# Patient Record
Sex: FEMALE | Race: WHITE | NOT HISPANIC OR LATINO | URBAN - METROPOLITAN AREA
[De-identification: names, ages, dates, MRNs, and addresses within clinical notes are randomized per-mention and may not be internally consistent; named-entity substitution may affect disease eponyms.]

---

## 2017-07-10 ENCOUNTER — OFFICE VISIT - HEALTHEAST (OUTPATIENT)
Dept: PODIATRY | Facility: CLINIC | Age: 62
End: 2017-07-10

## 2017-07-10 ENCOUNTER — RECORDS - HEALTHEAST (OUTPATIENT)
Dept: ADMINISTRATIVE | Facility: OTHER | Age: 62
End: 2017-07-10

## 2017-07-10 ENCOUNTER — RECORDS - HEALTHEAST (OUTPATIENT)
Dept: GENERAL RADIOLOGY | Facility: CLINIC | Age: 62
End: 2017-07-10

## 2017-07-10 DIAGNOSIS — M20.10 HALLUX VALGUS, UNSPECIFIED LATERALITY: ICD-10-CM

## 2017-07-10 DIAGNOSIS — M20.41 OTHER HAMMER TOE(S) (ACQUIRED), RIGHT FOOT: ICD-10-CM

## 2017-07-10 DIAGNOSIS — M20.10 HALLUX VALGUS (ACQUIRED), UNSPECIFIED FOOT: ICD-10-CM

## 2017-07-10 DIAGNOSIS — M20.41 HAMMER TOES OF BOTH FEET: ICD-10-CM

## 2017-07-10 DIAGNOSIS — M20.42 HAMMER TOES OF BOTH FEET: ICD-10-CM

## 2017-07-10 DIAGNOSIS — M20.42 OTHER HAMMER TOE(S) (ACQUIRED), LEFT FOOT: ICD-10-CM

## 2017-07-11 ENCOUNTER — COMMUNICATION - HEALTHEAST (OUTPATIENT)
Dept: ADMINISTRATIVE | Facility: CLINIC | Age: 62
End: 2017-07-11

## 2017-07-20 ENCOUNTER — RECORDS - HEALTHEAST (OUTPATIENT)
Dept: ADMINISTRATIVE | Facility: OTHER | Age: 62
End: 2017-07-20

## 2017-07-20 ENCOUNTER — COMMUNICATION - HEALTHEAST (OUTPATIENT)
Dept: ADMINISTRATIVE | Facility: CLINIC | Age: 62
End: 2017-07-20

## 2017-07-20 ENCOUNTER — COMMUNICATION - HEALTHEAST (OUTPATIENT)
Dept: INTERNAL MEDICINE | Facility: CLINIC | Age: 62
End: 2017-07-20

## 2017-07-21 ENCOUNTER — RECORDS - HEALTHEAST (OUTPATIENT)
Dept: ADMINISTRATIVE | Facility: OTHER | Age: 62
End: 2017-07-21

## 2017-10-03 ENCOUNTER — RECORDS - HEALTHEAST (OUTPATIENT)
Dept: ADMINISTRATIVE | Facility: OTHER | Age: 62
End: 2017-10-03

## 2018-03-23 ENCOUNTER — RECORDS - HEALTHEAST (OUTPATIENT)
Dept: LAB | Facility: CLINIC | Age: 63
End: 2018-03-23

## 2018-03-23 LAB
ALBUMIN SERPL-MCNC: 4 G/DL (ref 3.5–5)
ALP SERPL-CCNC: 78 U/L (ref 45–120)
ALT SERPL W P-5'-P-CCNC: 42 U/L (ref 0–45)
ANION GAP SERPL CALCULATED.3IONS-SCNC: 10 MMOL/L (ref 5–18)
AST SERPL W P-5'-P-CCNC: 39 U/L (ref 0–40)
BASOPHILS # BLD AUTO: 0.1 THOU/UL (ref 0–0.2)
BASOPHILS NFR BLD AUTO: 1 % (ref 0–2)
BILIRUB SERPL-MCNC: 0.7 MG/DL (ref 0–1)
BUN SERPL-MCNC: 12 MG/DL (ref 8–22)
CALCIUM SERPL-MCNC: 9.2 MG/DL (ref 8.5–10.5)
CHLORIDE BLD-SCNC: 104 MMOL/L (ref 98–107)
CHOLEST SERPL-MCNC: 266 MG/DL
CO2 SERPL-SCNC: 25 MMOL/L (ref 22–31)
CREAT SERPL-MCNC: 0.75 MG/DL (ref 0.6–1.1)
EOSINOPHIL # BLD AUTO: 0.2 THOU/UL (ref 0–0.4)
EOSINOPHIL NFR BLD AUTO: 4 % (ref 0–6)
ERYTHROCYTE [DISTWIDTH] IN BLOOD BY AUTOMATED COUNT: 12.7 % (ref 11–14.5)
FASTING STATUS PATIENT QL REPORTED: YES
GFR SERPL CREATININE-BSD FRML MDRD: >60 ML/MIN/1.73M2
GLUCOSE BLD-MCNC: 97 MG/DL (ref 70–125)
HCT VFR BLD AUTO: 43.3 % (ref 35–47)
HDLC SERPL-MCNC: 81 MG/DL
HGB BLD-MCNC: 14 G/DL (ref 12–16)
LDLC SERPL CALC-MCNC: 167 MG/DL
LYMPHOCYTES # BLD AUTO: 2 THOU/UL (ref 0.8–4.4)
LYMPHOCYTES NFR BLD AUTO: 35 % (ref 20–40)
MCH RBC QN AUTO: 33.2 PG (ref 27–34)
MCHC RBC AUTO-ENTMCNC: 32.3 G/DL (ref 32–36)
MCV RBC AUTO: 103 FL (ref 80–100)
MONOCYTES # BLD AUTO: 0.6 THOU/UL (ref 0–0.9)
MONOCYTES NFR BLD AUTO: 10 % (ref 2–10)
NEUTROPHILS # BLD AUTO: 2.9 THOU/UL (ref 2–7.7)
NEUTROPHILS NFR BLD AUTO: 50 % (ref 50–70)
PLATELET # BLD AUTO: 244 THOU/UL (ref 140–440)
PMV BLD AUTO: 10.7 FL (ref 8.5–12.5)
POTASSIUM BLD-SCNC: 5.1 MMOL/L (ref 3.5–5)
PROT SERPL-MCNC: 7.3 G/DL (ref 6–8)
RBC # BLD AUTO: 4.22 MILL/UL (ref 3.8–5.4)
SODIUM SERPL-SCNC: 139 MMOL/L (ref 136–145)
TRIGL SERPL-MCNC: 91 MG/DL
TSH SERPL DL<=0.005 MIU/L-ACNC: 2.34 UIU/ML (ref 0.3–5)
WBC: 5.8 THOU/UL (ref 4–11)

## 2021-05-28 ENCOUNTER — RECORDS - HEALTHEAST (OUTPATIENT)
Dept: ADMINISTRATIVE | Facility: CLINIC | Age: 66
End: 2021-05-28

## 2021-06-11 NOTE — PROGRESS NOTES
Admission History & Physical  Rossi Briceno, 1955, [id number]    [facility]  Slade He MD, [phone]    Extended Emergency Contact Information  Primary Emergency Contact: Harlan Qureshi  Address: [address] [place] [address]            [place] [address] [facility]  Home Phone: [phone]  Mobile Phone: [phone]  Relation: Spouse     Assessment and Plan:   Assessment: Hallux abductovalgus right foot, hammertoe second toe both feet, pronation deformity  Plan: I have recommended hammertoe correction second toe both feet and I have also recommended new orthotics.  Active Problems:    * No active hospital problems. *      Chief Complaint:  painful hammertoes both feet and painful big toe joint right foot      HPI:    Rossi Briceno is a 61 y.o. old female who presented to the clinic today complaining of pain involving both feet.  She indicated that she has seen a podiatrist approximately 8 years ago.  She was having problems with hammertoes at that time.  She was given a pair of orthotics.  At the time the orthotics were helping.  Now her hammertoes have gotten worse.  She finds it difficult to wear shoes without pain.  She has tried shoe modification.  She has been wearing new balance which seems to give her some additional room for her toes.  She also complained of a painful big toe joint right foot.  She has a bunion of the right foot.  She likes to remain active and exercise.  This is now beginning to cause pain.  History is provided by patient    Medical History  Active Ambulatory (Non-Hospital) Problems    Diagnosis     Major depression, recurrent, chronic     Mixed anxiety and depressive disorder     Hypothyroidism     GERD (gastroesophageal reflux disease)     Epigastric pain     Osteopenia     Hematoma     Past Medical History:   Diagnosis Date     Anxiety      Depression with anxiety      Disease of thyroid gland      DJD (degenerative joint disease) of cervical spine       GERD (gastroesophageal reflux disease)      Vertigo      Patient Active Problem List    Diagnosis Date Noted     Major depression, recurrent, chronic 04/06/2015     Mixed anxiety and depressive disorder 04/06/2015     Hypothyroidism 04/06/2015     GERD (gastroesophageal reflux disease) 04/06/2015     Epigastric pain 04/06/2015     Osteopenia 04/06/2015     Hematoma 11/25/2014     Surgical History  She  has a past surgical history that includes Appendectomy and Tonsillectomy.   Past Surgical History:   Procedure Laterality Date     APPENDECTOMY       TONSILLECTOMY      Social History  Reviewed, and she  reports that she has never smoked. She has never used smokeless tobacco. She reports that she drinks alcohol. She reports that she does not use illicit drugs.  Social History   Substance Use Topics     Smoking status: Never Smoker     Smokeless tobacco: Never Used     Alcohol use Yes      Comment: 5 glasses of wine / week      Allergies  Allergies   Allergen Reactions     Codeine      Hypotension with oxycodone     Oxycodone Other (See Comments)     Blood pressure dropped     Family History  Reviewed, and family history includes Dementia in her mother; No Medical Problems in her father.   Psychosocial Needs  Social History     Social History Narrative    , 3 grown children.  of autistic children. Nonsmoker. Drinks wine on weekends, 5 glasses.      Additional psychosocial needs reviewed per nursing assessment.       Prior to Admission Medications     (Not in a hospital admission)        Review of Systems - Negative     BP (!) 134/96  Pulse 71  SpO2 98%    Objective findings: Gen.: The patient is alert and in no acute distress      Integument: Nails bilateral feet are normal length and color. Skin bilaterally warm and intact.       Vascular:. DP and PT pulses +2 over 4 bilaterally. Capillary refill less than 2 seconds bilaterally.      Neurologic: Negative clonus, negative  Babinski bilaterally.      Musculoskeletal: Range of motion within normal limits bilaterally. Muscle power +5 over 5 bilaterally in all compartments.  There is a rigid flexion deformity at the proximal interphalangeal joint second toe both feet.  There is a small firm subcutaneous mass on the medial aspect of the head of the first metatarsal right foot.  There is severe flattening of the medial longitudinal arch noted bilaterally.      Assessment: Hallux abductovalgus right foot, hammertoe second toe both feet, pronation deformity      Plan: An x-ray of both feet were taken today.  I informed the patient that I would recommend hammertoe correction both feet.  She was told that her bunion is mild at this time and I do not recommend any surgical intervention to correct her mild bunion pain.  The patient was informed that this should alleviate her symptoms.  She was told the procedures are performed on an outpatient basis under local anesthesia.  She was told she would be able to weight-bear and ambulate in a postop shoe following the procedure for 3 weeks.  She was asked to go n.p.o. at midnight prior to the procedure.  She was asked to see her primary care physician for preoperative consultation.  She was given a written list of potential postoperative complications with these procedures.  I have also recommended new orthotics.

## 2021-08-07 ENCOUNTER — HEALTH MAINTENANCE LETTER (OUTPATIENT)
Age: 66
End: 2021-08-07

## 2021-10-02 ENCOUNTER — HEALTH MAINTENANCE LETTER (OUTPATIENT)
Age: 66
End: 2021-10-02

## 2021-11-27 ENCOUNTER — HEALTH MAINTENANCE LETTER (OUTPATIENT)
Age: 66
End: 2021-11-27

## 2022-09-03 ENCOUNTER — HEALTH MAINTENANCE LETTER (OUTPATIENT)
Age: 67
End: 2022-09-03

## 2023-01-15 ENCOUNTER — HEALTH MAINTENANCE LETTER (OUTPATIENT)
Age: 68
End: 2023-01-15

## 2023-09-30 ENCOUNTER — HEALTH MAINTENANCE LETTER (OUTPATIENT)
Age: 68
End: 2023-09-30

## 2024-02-17 ENCOUNTER — HEALTH MAINTENANCE LETTER (OUTPATIENT)
Age: 69
End: 2024-02-17